# Patient Record
Sex: MALE | Employment: FULL TIME | URBAN - METROPOLITAN AREA
[De-identification: names, ages, dates, MRNs, and addresses within clinical notes are randomized per-mention and may not be internally consistent; named-entity substitution may affect disease eponyms.]

---

## 2023-06-29 ENCOUNTER — LAB ENCOUNTER (OUTPATIENT)
Dept: LAB | Age: 63
End: 2023-06-29
Attending: INTERNAL MEDICINE
Payer: COMMERCIAL

## 2023-06-29 ENCOUNTER — OFFICE VISIT (OUTPATIENT)
Dept: INTERNAL MEDICINE CLINIC | Facility: CLINIC | Age: 63
End: 2023-06-29

## 2023-06-29 VITALS
WEIGHT: 167 LBS | SYSTOLIC BLOOD PRESSURE: 130 MMHG | HEART RATE: 70 BPM | HEIGHT: 67 IN | OXYGEN SATURATION: 98 % | DIASTOLIC BLOOD PRESSURE: 84 MMHG | BODY MASS INDEX: 26.21 KG/M2

## 2023-06-29 DIAGNOSIS — Z13.29 THYROID DISORDER SCREEN: ICD-10-CM

## 2023-06-29 DIAGNOSIS — Z23 NEED FOR SHINGLES VACCINE: ICD-10-CM

## 2023-06-29 DIAGNOSIS — E55.9 VITAMIN D DEFICIENCY: ICD-10-CM

## 2023-06-29 DIAGNOSIS — Z13.0 SCREENING FOR DEFICIENCY ANEMIA: ICD-10-CM

## 2023-06-29 DIAGNOSIS — Z13.21 ENCOUNTER FOR VITAMIN DEFICIENCY SCREENING: ICD-10-CM

## 2023-06-29 DIAGNOSIS — Z00.00 WELLNESS EXAMINATION: Primary | ICD-10-CM

## 2023-06-29 DIAGNOSIS — Z13.220 LIPID SCREENING: ICD-10-CM

## 2023-06-29 DIAGNOSIS — C61 PROSTATE CANCER (HCC): ICD-10-CM

## 2023-06-29 DIAGNOSIS — Z00.00 WELLNESS EXAMINATION: ICD-10-CM

## 2023-06-29 DIAGNOSIS — R73.09 ELEVATED GLUCOSE: ICD-10-CM

## 2023-06-29 DIAGNOSIS — Z13.6 SCREENING FOR CARDIOVASCULAR CONDITION: ICD-10-CM

## 2023-06-29 DIAGNOSIS — Z12.5 PROSTATE CANCER SCREENING: ICD-10-CM

## 2023-06-29 DIAGNOSIS — R03.0 ELEVATED BLOOD PRESSURE READING: ICD-10-CM

## 2023-06-29 PROBLEM — M17.0 PRIMARY OSTEOARTHRITIS OF BOTH KNEES: Status: ACTIVE | Noted: 2023-06-29

## 2023-06-29 PROBLEM — R73.03 PREDIABETES: Status: ACTIVE | Noted: 2023-06-29

## 2023-06-29 PROBLEM — I83.90 VARICOSE VEINS: Status: ACTIVE | Noted: 2023-06-29

## 2023-06-29 PROBLEM — E78.2 MIXED HYPERLIPIDEMIA: Status: ACTIVE | Noted: 2023-06-29

## 2023-06-29 LAB
ALBUMIN SERPL-MCNC: 4.2 G/DL (ref 3.4–5)
ALBUMIN/GLOB SERPL: 1.3 {RATIO} (ref 1–2)
ALP LIVER SERPL-CCNC: 66 U/L
ALT SERPL-CCNC: 29 U/L
ANION GAP SERPL CALC-SCNC: 6 MMOL/L (ref 0–18)
AST SERPL-CCNC: 18 U/L (ref 15–37)
ATRIAL RATE: 55 BPM
BASOPHILS # BLD AUTO: 0.06 X10(3) UL (ref 0–0.2)
BASOPHILS NFR BLD AUTO: 0.8 %
BILIRUB SERPL-MCNC: 1 MG/DL (ref 0.1–2)
BUN BLD-MCNC: 16 MG/DL (ref 7–18)
BUN/CREAT SERPL: 16.7 (ref 10–20)
CALCIUM BLD-MCNC: 9.8 MG/DL (ref 8.5–10.1)
CHLORIDE SERPL-SCNC: 109 MMOL/L (ref 98–112)
CHOLEST SERPL-MCNC: 184 MG/DL (ref ?–200)
CO2 SERPL-SCNC: 27 MMOL/L (ref 21–32)
COMPLEXED PSA SERPL-MCNC: 0.02 NG/ML (ref ?–4)
CREAT BLD-MCNC: 0.96 MG/DL
DEPRECATED RDW RBC AUTO: 45 FL (ref 35.1–46.3)
EOSINOPHIL # BLD AUTO: 0.44 X10(3) UL (ref 0–0.7)
EOSINOPHIL NFR BLD AUTO: 5.8 %
ERYTHROCYTE [DISTWIDTH] IN BLOOD BY AUTOMATED COUNT: 14.2 % (ref 11–15)
EST. AVERAGE GLUCOSE BLD GHB EST-MCNC: 126 MG/DL (ref 68–126)
FASTING PATIENT LIPID ANSWER: YES
FASTING STATUS PATIENT QL REPORTED: YES
GFR SERPLBLD BASED ON 1.73 SQ M-ARVRAT: 89 ML/MIN/1.73M2 (ref 60–?)
GLOBULIN PLAS-MCNC: 3.3 G/DL (ref 2.8–4.4)
GLUCOSE BLD-MCNC: 104 MG/DL (ref 70–99)
HBA1C MFR BLD: 6 % (ref ?–5.7)
HCT VFR BLD AUTO: 48.6 %
HDLC SERPL-MCNC: 49 MG/DL (ref 40–59)
HGB BLD-MCNC: 15.6 G/DL
IMM GRANULOCYTES # BLD AUTO: 0.01 X10(3) UL (ref 0–1)
IMM GRANULOCYTES NFR BLD: 0.1 %
LDLC SERPL CALC-MCNC: 119 MG/DL (ref ?–100)
LYMPHOCYTES # BLD AUTO: 2.64 X10(3) UL (ref 1–4)
LYMPHOCYTES NFR BLD AUTO: 34.6 %
MCH RBC QN AUTO: 27.7 PG (ref 26–34)
MCHC RBC AUTO-ENTMCNC: 32.1 G/DL (ref 31–37)
MCV RBC AUTO: 86.3 FL
MONOCYTES # BLD AUTO: 0.65 X10(3) UL (ref 0.1–1)
MONOCYTES NFR BLD AUTO: 8.5 %
NEUTROPHILS # BLD AUTO: 3.82 X10 (3) UL (ref 1.5–7.7)
NEUTROPHILS # BLD AUTO: 3.82 X10(3) UL (ref 1.5–7.7)
NEUTROPHILS NFR BLD AUTO: 50.2 %
NONHDLC SERPL-MCNC: 135 MG/DL (ref ?–130)
OSMOLALITY SERPL CALC.SUM OF ELEC: 295 MOSM/KG (ref 275–295)
P AXIS: 62 DEGREES
P-R INTERVAL: 160 MS
PLATELET # BLD AUTO: 237 10(3)UL (ref 150–450)
POTASSIUM SERPL-SCNC: 4.9 MMOL/L (ref 3.5–5.1)
PROT SERPL-MCNC: 7.5 G/DL (ref 6.4–8.2)
Q-T INTERVAL: 400 MS
QRS DURATION: 86 MS
QTC CALCULATION (BEZET): 382 MS
R AXIS: 73 DEGREES
RBC # BLD AUTO: 5.63 X10(6)UL
SODIUM SERPL-SCNC: 142 MMOL/L (ref 136–145)
T AXIS: 58 DEGREES
TRIGL SERPL-MCNC: 89 MG/DL (ref 30–149)
TSI SER-ACNC: 1.64 MIU/ML (ref 0.36–3.74)
VENTRICULAR RATE: 55 BPM
VIT D+METAB SERPL-MCNC: 31.3 NG/ML (ref 30–100)
VLDLC SERPL CALC-MCNC: 16 MG/DL (ref 0–30)
WBC # BLD AUTO: 7.6 X10(3) UL (ref 4–11)

## 2023-06-29 PROCEDURE — 83036 HEMOGLOBIN GLYCOSYLATED A1C: CPT

## 2023-06-29 PROCEDURE — 36415 COLL VENOUS BLD VENIPUNCTURE: CPT

## 2023-06-29 PROCEDURE — 90471 IMMUNIZATION ADMIN: CPT | Performed by: INTERNAL MEDICINE

## 2023-06-29 PROCEDURE — 84443 ASSAY THYROID STIM HORMONE: CPT

## 2023-06-29 PROCEDURE — 90750 HZV VACC RECOMBINANT IM: CPT | Performed by: INTERNAL MEDICINE

## 2023-06-29 PROCEDURE — 3079F DIAST BP 80-89 MM HG: CPT | Performed by: INTERNAL MEDICINE

## 2023-06-29 PROCEDURE — 80061 LIPID PANEL: CPT

## 2023-06-29 PROCEDURE — 93000 ELECTROCARDIOGRAM COMPLETE: CPT | Performed by: INTERNAL MEDICINE

## 2023-06-29 PROCEDURE — 3075F SYST BP GE 130 - 139MM HG: CPT | Performed by: INTERNAL MEDICINE

## 2023-06-29 PROCEDURE — 3008F BODY MASS INDEX DOCD: CPT | Performed by: INTERNAL MEDICINE

## 2023-06-29 PROCEDURE — 99386 PREV VISIT NEW AGE 40-64: CPT | Performed by: INTERNAL MEDICINE

## 2023-06-29 PROCEDURE — 85025 COMPLETE CBC W/AUTO DIFF WBC: CPT

## 2023-06-29 PROCEDURE — 80053 COMPREHEN METABOLIC PANEL: CPT

## 2023-06-29 PROCEDURE — 82306 VITAMIN D 25 HYDROXY: CPT

## 2023-06-29 RX ORDER — NAPROXEN
POWDER (GRAM) MISCELLANEOUS AS DIRECTED
COMMUNITY

## 2023-06-29 RX ORDER — HYDROCODONE BITARTRATE AND ACETAMINOPHEN 10; 325 MG/1; MG/1
TABLET ORAL
COMMUNITY

## 2023-06-30 ENCOUNTER — TELEPHONE (OUTPATIENT)
Dept: INTERNAL MEDICINE CLINIC | Facility: CLINIC | Age: 63
End: 2023-06-30

## 2023-06-30 ENCOUNTER — PATIENT OUTREACH (OUTPATIENT)
Dept: CASE MANAGEMENT | Age: 63
End: 2023-06-30

## 2023-12-06 ENCOUNTER — TELEPHONE (OUTPATIENT)
Dept: INTERNAL MEDICINE CLINIC | Facility: CLINIC | Age: 63
End: 2023-12-06

## 2023-12-06 NOTE — TELEPHONE ENCOUNTER
Patient is due for 2nd shingrix vaccine, please assist with scheduling a nurse visit for Shingrix vaccine.

## 2023-12-06 NOTE — TELEPHONE ENCOUNTER
Daughter, would like to schedule the 2nd shingles vaccine for the patient. Please, confirm when the patient is due for it.

## 2023-12-13 ENCOUNTER — NURSE ONLY (OUTPATIENT)
Dept: INTERNAL MEDICINE CLINIC | Facility: CLINIC | Age: 63
End: 2023-12-13

## 2023-12-13 VITALS — TEMPERATURE: 99 F

## 2023-12-13 DIAGNOSIS — Z23 NEED FOR SHINGLES VACCINE: Primary | ICD-10-CM

## 2023-12-13 PROCEDURE — 90471 IMMUNIZATION ADMIN: CPT | Performed by: INTERNAL MEDICINE

## 2023-12-13 PROCEDURE — 90750 HZV VACC RECOMBINANT IM: CPT | Performed by: INTERNAL MEDICINE

## 2024-02-09 ENCOUNTER — LAB ENCOUNTER (OUTPATIENT)
Dept: LAB | Age: 64
End: 2024-02-09
Attending: INTERNAL MEDICINE
Payer: MEDICAID

## 2024-02-09 ENCOUNTER — MED REC SCAN ONLY (OUTPATIENT)
Dept: INTERNAL MEDICINE CLINIC | Facility: CLINIC | Age: 64
End: 2024-02-09

## 2024-02-09 ENCOUNTER — OFFICE VISIT (OUTPATIENT)
Dept: INTERNAL MEDICINE CLINIC | Facility: CLINIC | Age: 64
End: 2024-02-09
Payer: MEDICAID

## 2024-02-09 VITALS
DIASTOLIC BLOOD PRESSURE: 82 MMHG | OXYGEN SATURATION: 95 % | HEIGHT: 67 IN | SYSTOLIC BLOOD PRESSURE: 143 MMHG | BODY MASS INDEX: 26.37 KG/M2 | WEIGHT: 168 LBS | HEART RATE: 62 BPM

## 2024-02-09 DIAGNOSIS — R73.09 ELEVATED GLUCOSE: ICD-10-CM

## 2024-02-09 DIAGNOSIS — Z00.00 WELLNESS EXAMINATION: ICD-10-CM

## 2024-02-09 DIAGNOSIS — Z00.00 WELLNESS EXAMINATION: Primary | ICD-10-CM

## 2024-02-09 DIAGNOSIS — Z23 NEED FOR VACCINATION FOR PNEUMOCOCCUS: ICD-10-CM

## 2024-02-09 DIAGNOSIS — Z13.220 LIPID SCREENING: ICD-10-CM

## 2024-02-09 DIAGNOSIS — Z23 NEEDS FLU SHOT: ICD-10-CM

## 2024-02-09 DIAGNOSIS — R03.0 ELEVATED BLOOD PRESSURE READING: ICD-10-CM

## 2024-02-09 DIAGNOSIS — M17.0 PRIMARY OSTEOARTHRITIS OF BOTH KNEES: ICD-10-CM

## 2024-02-09 LAB
CHOLEST SERPL-MCNC: 162 MG/DL (ref ?–200)
EST. AVERAGE GLUCOSE BLD GHB EST-MCNC: 131 MG/DL (ref 68–126)
FASTING PATIENT LIPID ANSWER: YES
HBA1C MFR BLD: 6.2 % (ref ?–5.7)
HDLC SERPL-MCNC: 43 MG/DL (ref 40–59)
LDLC SERPL CALC-MCNC: 99 MG/DL (ref ?–100)
NONHDLC SERPL-MCNC: 119 MG/DL (ref ?–130)
TRIGL SERPL-MCNC: 107 MG/DL (ref 30–149)
VLDLC SERPL CALC-MCNC: 18 MG/DL (ref 0–30)

## 2024-02-09 PROCEDURE — 90471 IMMUNIZATION ADMIN: CPT | Performed by: INTERNAL MEDICINE

## 2024-02-09 PROCEDURE — 80061 LIPID PANEL: CPT

## 2024-02-09 PROCEDURE — 83036 HEMOGLOBIN GLYCOSYLATED A1C: CPT

## 2024-02-09 PROCEDURE — 90686 IIV4 VACC NO PRSV 0.5 ML IM: CPT | Performed by: INTERNAL MEDICINE

## 2024-02-09 PROCEDURE — 99396 PREV VISIT EST AGE 40-64: CPT | Performed by: INTERNAL MEDICINE

## 2024-02-09 PROCEDURE — 36415 COLL VENOUS BLD VENIPUNCTURE: CPT

## 2024-02-09 PROCEDURE — 90677 PCV20 VACCINE IM: CPT | Performed by: INTERNAL MEDICINE

## 2024-02-09 PROCEDURE — 90472 IMMUNIZATION ADMIN EACH ADD: CPT | Performed by: INTERNAL MEDICINE

## 2024-02-09 NOTE — PATIENT INSTRUCTIONS
Cómo controlar la presión arterial erika  La presión arterial erika (hipertensión) se conoce también shantel el asesino silencioso. Se la llama así porque muchas personas la tienen sin saberlo. Puede ser muy peligrosa. La presión arterial erika puede aumentar el riesgo de ataques al corazón, derrames cerebrales, enfermedades del corazón o insuficiencia cardíaca. Controlar la presión arterial puede disminuir el riesgo de tener estos problemas. Es importantecontrolar la presión arterial de manera periódica. [place] puede salvarle la iván.   Las mediciones de la presión arterial se indican con 2 números. La presión arterial sistólica es el número superior. Es la presión cuando el corazón se contrae. La presión arterial diastólica es el número inferior. Es la presión cuando el corazón se relaja entrelatidos.   La presión arterial se agrupa de la siguiente manera:  Presión arterial normal. Se llama de esta manera cuando la presión sistólica es brett que 120 y la diastólica es brett que 80 (120/80).  Presión arterial elevada. Se llama de esta manera cuando la presión sistólica es de 120 a 129 y la diastólica es brett que 80.  Presión arterial erika de etapa 1. La presión sistólica está entre 130 y 139 o la diastólica está entre 80 y 89.  Presión arterial erika de etapa 2. La presión sistólica es 140 o superior o la diastólica es 90 o superior.  Un estilo de iván saludable para el corazón puede ayudarlo a controlar garcia presión arterial sin tener que hernandez medicamentos. Más abajo encontrará algunasrecomendaciones a fin de llevar un estilo de iván saludable para el corazón.     Coma alimentos saludables para el corazón  Elija alimentos con bajo contenido de sodio y grasas. Limite la ingesta de sodio a 2,300 mg diarios o según la cantidad que le indique el proveedor de atención médica.  Limite el consumo de comidas enlatadas, secas, curadas, envasadas y las comidas rápidas. Pueden contener mucha sal.  Consuma entre ocho y gabriele porciones  de frutas y verduras todos los leora.  Elija flor de res magras, pescado o domenica.  Coma pasta y arroz integrales y frijoles.  Consuma entre dos y constance porciones de productos lácteos descremados o bajos en grasa.  Consulte con garcia médico acerca del plan de alimentación DASH. Shayna plan ayuda a reducir la presión arterial.  Cuando coma en un restaurante, pida que le preparen los platos sin sal.    Mantenga un peso saludable  Pregunte a garcia proveedor de atención médica cuántas calorías puede consumir por día. Respete michelle cantidad.  Pregunte al proveedor cuáles son los límites de peso más adecuados para usted. Si tiene sobrepeso, perder entre un 3 % y un 5 % de garcia peso corporal puede ayudarlo a disminuir garcia presión arterial. Un buen objetivo es perder un 10 % de garcia peso corporal en un año.  Limite el consumo de tentempiés y dulces.  Nancy ejercicio con regularidad.    Nancy más actividad física  Busque actividades que le gusten. Puede hacerlas de forma individual, o con amigos o garcia alicia. Pruebe andar en bicicleta, bailar, caminar o trotar.  Estacione lejos de las entradas de los edificios para caminar más.  Use las escaleras en lugar del ascensor.  Cuando pueda, camine o vaya en bicicleta en vez de ir en automóvil.  [place] hojas, trabaje en garcia jardín o nancy reparaciones en la casa.  Nancy actividad física de moderada a intensa aniya, al menos, 30 minutos por día, un mínimo de sakina días a la semana.     Controle el estrés  Tómese tiempo para relajarse y disfrutar de la iván. Encuentre tiempo para reírse.  Comparta rajinder preocupaciones con rajinder seres queridos y con el proveedor de atención médica.  Visite a rajinder familiares y amigos, y mantenga rajinder pasatiempos favoritos.    Limite el alcohol y deje de fumar  Los hombres no deberían hernandez más de dos bebidas alcohólicas por día.  Las mujeres no deberían hernandez más de jennifer bebida alcohólica por día.  Si fuma, elabore un plan para dejar de hacerlo. Pídale ayuda a garcia proveedor de  atención médica. Si fuma mucho, aumenta el riesgo de padecer enfermedades del corazón y derrames cerebrales. Consulte con el proveedor sobre programas para dejar de fumar y otros recursos de apoyo.    Medicamentos para la presión arterial  Si los cambios en el estilo de iván no son suficientes, el proveedor de atención médica puede recetarle medicamentos para la presión arterial chandrika. [place] todos los medicamentos según le indiquen. Si tiene alguna pregunta sobre los medicamentos, consulte al proveedor antes de dejar de tomarlos ocambiarlos.     © 2000-2022 [employer]. Todos los derechos reservados. Esta información no pretende sustituir la atención médica profesional. Sólo sumédico puede diagnosticar y tratar un problema de timothy.    Video [employer]  ¿Qué es Presión Arterial Chandrika?  Entender que es Presión Arterial, los riesgos en la timothy al tener Presión Arterial Chandrika, los factores que lo ponen a usted en riesgo de tener Presión Arterial [place] y la importancia de trabajar con garcia proveedor del cuidado de la timothy para controlarla.  Para rob el video:  Escanee el código code  Utilizando garcia dispositivo móvil, escanee el siguiente código:  OR  Giovanni al sitio web:  [employer]  Ingrese el código de receta:  3414S    © 2000-2022 [employer]. All rights reserved. This information is not intended as a substitute for professional medical care. Always follow your healthcare professional's instructions.    Video [employer]  [place] amrik con presión arterial elevada  Ciertos alimentos pueden hacer que garcia presión suba demasiado. Ric y aprenda lo fácil que es disfrutar de comidas deliciosas sin dañar garcia timothy.     Para rob el video:  Escanee el código QR  Utilizando garcia dispositivo móvil, escanee el siguiente código:  MANE Davila al [employer] web:  www."[employer]  Ingrese el código de receta:   DPX    © 6697-1745 [employer]. All rights reserved. This information is not  intended as a substitute for professional medical care. Always follow your healthcare professional's instructions.    Cómo tomarse la presión arterial  La presión arterial es la fuerza que la brian ejerce contra las pulido de los vasos al desplazarse por ellos. Usted puede tomarse garcia propia presión arterial con un medidor digital. Tómese la presión arterial a la misma hora y en elmismo brazo, tan a menudo shantel le indique garcia proveedor de atención médica.   Acerca de los monitores de presión arterial   Los monitores de presión arterial están diseñados para diferentes edades y diferentes casos. Usted puede encontrar monitores para adultos mayores, para mujeres embarazadas y para niños. Asegúrese de que el que escoja es el adecuadopara garcia edad y situación.   La American [employer] recomienda un monitor automatizado con manguito que se adapte al tamaño de garcia brazo (biceps). Garcia brazo debe caber amrik dentro del manguito, ya que si es muy kendy o muy pequeño no suministrará jennifer lectura exacta. Mídase el grosor de garcia barazo paraencontrar la talla correcta para usted.   Los monitores que se ahieren a un dedo o a la binh no son tan precisos comolos que se adhieren a garcia brazo.   Pídale a garcia proveedor de atención médica que le ayude a escoger un monitor. Traiga el monitor a garcia próxima lexus médica si necesita ayuda para aprender ausarlo correctamente.   Los pasos que se dan a continuación son instrucciones generales para usar unmonitor digital automatizado.     1. Relájese  Tómese la presión arterial a la misma hora todos los días, shantel temprano en la mañana o al final de la tarde.  Espere al menos jennifer hora después de kevin fumado, comido o hecho ejercicio. No ryan café, té, soda u otras bebidas cafeínadas antes de tomarse la presión.  Siéntese cómodamente a la cho. Coloque el medidor digital cerca de usted.  Descanse aniya algunos minutos antes de empezar.    2. Coloque el manguito  Ponga el brazo sobre la cho,  con la rodriguez de la mano hacia arriba. El brazo debe quedar a la altura del corazón. Ajuste el manguito alrededor del brazo, joleen por encima del codo. Es mejor colocarlo sobre la piel desnuda, sin ropa. La mayoría de los manguitos le indicarán donde debe alinearlo con la arteria braquial (el vaso sanguíneo en la mitad del brazo en la parte interna del codo). Yanely las instrucciones que vienen con garcia monitor para rob jennifer ilustración. También puede llevar garcia manguito a la lexus con garcia proveedor de atención médica para que le muestren cómo usarlo correctamente.    3. Infle el manguito  Oprima el botón para iniciar el bombeo automático.  El manguito evonne se aprieta y luego se afloja.  Los números cambian. Cuando paran de cambiar, el medidor indicará garcia presión arterial.  Tómese 2 o 3 lecturas con un minuto de diferencia entre jennifer y otra.      4. Anote los resultados  [place] nota de los números de garcia presión arterial, así shantel de la fecha y la hora de la medición. Mantenga los resultados en un solo lugar, por ejemplo en un cuaderno. Incluso si garcia monitor tiene jennifer memoria incorporada, mantenga jennifer copia por escrito de los resultados.  Quite el manguito del brazo y apague la máquina.  Traiga los resultados de rajinder lecturas a cada jennifer de las citas con garcia proveedor de atención médica. También anote el días si cambió la dosis de garcia medicamento. Monty inforamación debe incuirse en los registros que nancy de garcia presión arterial para que le ayude a garcia proveedor de atención médica a evaluar qué tan amrik están funcionando los cambios en el medicamento.  Pregúntele a garcia proveedor de atención médica a qué niveles debe obtener ayuda de inmediato.      © 4385-8325 [employer]. Todos los derechos reservados. Esta información no pretende sustituir la atención médica profesional. Sólo sumédico puede diagnosticar y tratar un problema de timothy.

## 2024-02-09 NOTE — PROGRESS NOTES
Outpatient Office Note    HPI:     Salomón Wilde is a 63 year old male.  Chief Complaint   Patient presents with    Physical       Very pleasant 63-year-old gentleman with past medical history of prostate cancer status post resection in  in remission, prediabetes, hyperlipidemia, vitamin D deficiency, primary osteoarthritis of both knees status post total right knee replacement and partial left knee replacement who is here for annual physical     Patient is active but has been suffering from bilateral knee pain recently.    Last tetanus shot was 5 years ago as far as he knows blood pressure was found elevated today.  Patient does not carry history of hypertension.  Currently asymptomatic      Current Outpatient Medications   Medication Sig Dispense Refill    Multiple Vitamins-Minerals (MULTIVITAMIN ADULT OR) Take by mouth.      aspirin 81 MG Oral Tab Take 1 tablet (81 mg total) by mouth daily.      Naproxen Does not apply Powder Take by mouth As Directed. (Patient not taking: Reported on 2023)      HYDROcodone-acetaminophen  MG Oral Tab TAKE 1 TABLET BY MOUTH EVERY 6 HOURS AS NEEDED FOR PAIN AFTER SURGERY (Patient not taking: Reported on 2024)      simvastatin 20 MG Oral Tab Take 1 tablet (20 mg total) by mouth nightly. (Patient not taking: Reported on 2024)        Past Medical History:   Diagnosis Date    Asthma     Prostate cancer (HCC)       Past Surgical History:   Procedure Laterality Date    CHOLECYSTECTOMY      HERNIA SURGERY      OTHER      prostate removal    TOTAL KNEE REPLACEMENT Right       Social History:  Social History     Socioeconomic History    Marital status:    Tobacco Use    Smoking status: Former     Packs/day: 0.25     Years: 15.00     Additional pack years: 0.00     Total pack years: 3.75     Types: Cigarettes     Quit date:      Years since quittin.1   Substance and Sexual Activity    Alcohol use: Not Currently     Comment: socially    Drug use: No       Family History   Problem Relation Age of Onset    Diabetes Mother       No Known Allergies     REVIEW OF SYSTEMS:   Review of Systems   Review of Systems   Constitutional: Negative for activity change, appetite change and fever.   HENT: Negative for congestion and voice change.    Respiratory: Negative for cough and shortness of breath.    Cardiovascular: Negative for chest pain.   Gastrointestinal: Negative for abdominal distention, abdominal pain and vomiting.   Genitourinary: Negative for hematuria.   Skin: Negative for wound.   Psychiatric/Behavioral: Negative for behavioral problems.   Wt Readings from Last 5 Encounters:   02/09/24 168 lb (76.2 kg)   06/29/23 167 lb (75.8 kg)   09/22/16 178 lb (80.7 kg)     Body mass index is 26.31 kg/m².      EXAM:   /82   Pulse 62   Ht 5' 7\" (1.702 m)   Wt 168 lb (76.2 kg)   SpO2 95%   BMI 26.31 kg/m²   Physical Exam   Constitutional:       Appearance: Normal appearance.   HENT:      Head: Normocephalic.   Eyes:      Conjunctiva/sclera: Conjunctivae normal.   Cardiovascular:      Rate and Rhythm: Normal rate and regular rhythm.      Heart sounds: Normal heart sounds. No murmur heard.  Pulmonary:      Effort: Pulmonary effort is normal.      Breath sounds: Normal breath sounds. No rhonchi or rales.   Abdominal:      General: Bowel sounds are normal.      Palpations: Abdomen is soft.      Tenderness: There is no abdominal tenderness.   Musculoskeletal:      Cervical back: Neck supple.      Right lower leg: No edema.      Left lower leg: No edema.   Skin:     General: Skin is warm and dry.   Neurological:      General: No focal deficit present.      Mental Status: He is alert and oriented to person, place, and time. Mental status is at baseline.   Psychiatric:         Mood and Affect: Mood normal.         Behavior: Behavior normal.       Health Maintenence:     Health Maintenance Topics with due status: Overdue       Topic Date Due    Pneumococcal Vaccine: Birth to  63yrs 03/13/2017    COVID-19 Vaccine 09/01/2023    Influenza Vaccine 10/01/2023    Annual Depression Screening 01/01/2024            ASSESSMENT AND PLAN:   1. Primary osteoarthritis of both knees  - Physical Therapy Referral - [facility]    2. Need for vaccination for pneumococcus  - Prevnar 20 (PCV20) [[id number]]    3. Elevated glucose  - Hemoglobin A1C; Future    4. Wellness examination  - Lipid Panel; Future  - Hemoglobin A1C; Future    5. Lipid screening  - Lipid Panel; Future    6. Needs flu shot  - Fluzone Quadrivalent 6mo+ 0.5mL    7. Elevated blood pressure reading  -Instructed the patient to get a blood pressure machine, check blood pressure levels daily in the morning and keep a log of the readings for review.        The patient indicates understanding of these issues and agrees to the plan.  No follow-ups on file.        This note was prepared using Dragon Medical voice recognition dictation software. As a result errors may occur. When identified these errors have been corrected. While every attempt is made to correct errors during dictation discrepancies may still exist.    Brittney Patrick MD

## 2024-02-12 DIAGNOSIS — R73.03 PREDIABETES: Primary | ICD-10-CM

## 2024-02-12 RX ORDER — METFORMIN HYDROCHLORIDE 500 MG/1
500 TABLET, EXTENDED RELEASE ORAL
Qty: 90 TABLET | Refills: 3 | Status: SHIPPED | OUTPATIENT
Start: 2024-02-12

## 2024-02-27 ENCOUNTER — OFFICE VISIT (OUTPATIENT)
Dept: INTERNAL MEDICINE CLINIC | Facility: CLINIC | Age: 64
End: 2024-02-27

## 2024-02-27 VITALS
BODY MASS INDEX: 26.68 KG/M2 | HEART RATE: 70 BPM | HEIGHT: 67 IN | OXYGEN SATURATION: 98 % | SYSTOLIC BLOOD PRESSURE: 142 MMHG | DIASTOLIC BLOOD PRESSURE: 78 MMHG | WEIGHT: 170 LBS

## 2024-02-27 DIAGNOSIS — R03.0 ELEVATED BLOOD PRESSURE READING: Primary | ICD-10-CM

## 2024-02-27 PROCEDURE — 99213 OFFICE O/P EST LOW 20 MIN: CPT | Performed by: INTERNAL MEDICINE

## 2024-02-27 NOTE — PROGRESS NOTES
Outpatient Office Note    HPI:     Salomón Wilde is a 63 year old male.  Chief Complaint   Patient presents with    Follow - Up     BP follow-up         Very pleasant 63-year-old gentleman with past medical history of prostate cancer status post resection in 2007 in remission, prediabetes, hyperlipidemia, vitamin D deficiency, primary osteoarthritis of both knees status post total right knee replacement and partial left knee replacement who is here for follow-up on blood pressure      Patient has been checking his blood pressure daily at home with a wrist machine.  Readings are all in the 120s/70s.  However his blood pressure remains elevated today in the office.  Patient remains asymptomatic.  Reports his diet has become healthier as he and his wife have been trying to eliminate grease and unhealthy fats.      Current Outpatient Medications   Medication Sig Dispense Refill    metFORMIN  MG Oral Tablet 24 Hr Take 1 tablet (500 mg total) by mouth daily with breakfast. 90 tablet 3    Multiple Vitamins-Minerals (MULTIVITAMIN ADULT OR) Take by mouth.      aspirin 81 MG Oral Tab Take 1 tablet (81 mg total) by mouth daily.      Naproxen Does not apply Powder Take by mouth As Directed. (Patient not taking: Reported on 6/29/2023)      HYDROcodone-acetaminophen  MG Oral Tab TAKE 1 TABLET BY MOUTH EVERY 6 HOURS AS NEEDED FOR PAIN AFTER SURGERY (Patient not taking: Reported on 2/9/2024)      simvastatin 20 MG Oral Tab Take 1 tablet (20 mg total) by mouth nightly. (Patient not taking: Reported on 2/9/2024)        Past Medical History:   Diagnosis Date    Asthma (HCC)     Prostate cancer (HCC)       Past Surgical History:   Procedure Laterality Date    CHOLECYSTECTOMY      HERNIA SURGERY      OTHER      prostate removal    TOTAL KNEE REPLACEMENT Right       Social History:  Social History     Socioeconomic History    Marital status:    Tobacco Use    Smoking status: Former     Packs/day: 0.25     Years: 15.00      Additional pack years: 0.00     Total pack years: 3.75     Types: Cigarettes     Quit date:      Years since quittin.1   Substance and Sexual Activity    Alcohol use: Not Currently     Comment: socially    Drug use: No      Family History   Problem Relation Age of Onset    Diabetes Mother       No Known Allergies     REVIEW OF SYSTEMS:   Review of Systems   Review of Systems   Constitutional: Negative for activity change, appetite change and fever.   HENT: Negative for congestion and voice change.    Respiratory: Negative for cough and shortness of breath.    Cardiovascular: Negative for chest pain.   Gastrointestinal: Negative for abdominal distention, abdominal pain and vomiting.   Genitourinary: Negative for hematuria.   Skin: Negative for wound.   Psychiatric/Behavioral: Negative for behavioral problems.   Wt Readings from Last 5 Encounters:   24 170 lb (77.1 kg)   24 168 lb (76.2 kg)   23 167 lb (75.8 kg)   16 178 lb (80.7 kg)     Body mass index is 26.63 kg/m².      EXAM:   /78   Pulse 70   Ht 5' 7\" (1.702 m)   Wt 170 lb (77.1 kg)   SpO2 98%   BMI 26.63 kg/m²   Physical Exam   Constitutional:       Appearance: Normal appearance.   HENT:      Head: Normocephalic.   Eyes:      Conjunctiva/sclera: Conjunctivae normal.   Cardiovascular:      Rate and Rhythm: Normal rate   Pulmonary:      Effort: Pulmonary effort is normal.   Skin:     General: Skin is warm and dry.   Neurological:      General: No focal deficit present.      Mental Status: He is alert and oriented to person, place, and time. Mental status is at baseline.   Psychiatric:         Mood and Affect: Mood normal.         Behavior: Behavior normal.       Health Maintenence:     Health Maintenance Topics with due status: Overdue       Topic Date Due    COVID-19 Vaccine 2023     Health Maintenance Topics with due status: Due Soon       Topic Date Due    HTN: BP Follow-Up 2024               ASSESSMENT AND PLAN:   1. Elevated blood pressure reading  -Patient instructed to bring his blood pressure machine to the next appointment so that we can compare its readings to ours          The patient indicates understanding of these issues and agrees to the plan.  No follow-ups on file.        This note was prepared using Dragon Medical voice recognition dictation software. As a result errors may occur. When identified these errors have been corrected. While every attempt is made to correct errors during dictation discrepancies may still exist.    Brittney Patrick MD

## 2024-03-25 ENCOUNTER — OFFICE VISIT (OUTPATIENT)
Dept: INTERNAL MEDICINE CLINIC | Facility: CLINIC | Age: 64
End: 2024-03-25

## 2024-03-25 VITALS
BODY MASS INDEX: 26.68 KG/M2 | DIASTOLIC BLOOD PRESSURE: 80 MMHG | WEIGHT: 170 LBS | OXYGEN SATURATION: 97 % | SYSTOLIC BLOOD PRESSURE: 125 MMHG | HEART RATE: 62 BPM | HEIGHT: 67 IN

## 2024-03-25 DIAGNOSIS — R09.82 POST-NASAL DRIP: Primary | ICD-10-CM

## 2024-03-25 DIAGNOSIS — R03.0 ELEVATED BLOOD PRESSURE READING: ICD-10-CM

## 2024-03-25 PROCEDURE — 99213 OFFICE O/P EST LOW 20 MIN: CPT | Performed by: INTERNAL MEDICINE

## 2024-03-25 RX ORDER — FLUTICASONE PROPIONATE 50 MCG
2 SPRAY, SUSPENSION (ML) NASAL DAILY
Qty: 15.8 ML | Refills: 0 | Status: SHIPPED | OUTPATIENT
Start: 2024-03-25 | End: 2025-03-20

## 2024-03-25 RX ORDER — ASPIRIN 81 MG/1
81 TABLET, CHEWABLE ORAL DAILY
Qty: 90 TABLET | Refills: 3 | Status: SHIPPED | OUTPATIENT
Start: 2024-03-25

## 2024-03-25 NOTE — PROGRESS NOTES
Outpatient Office Note    HPI:     Salomón Wilde is a 63 year old male.  Chief Complaint   Patient presents with    Follow - Up     BP follow-up       Very pleasant 63-year-old gentleman with past medical history of prostate cancer status post resection in 2007 in remission, prediabetes, hyperlipidemia, vitamin D deficiency, primary osteoarthritis of both knees status post total right knee replacement and partial left knee replacement who is here for follow-up on blood pressure     Patient brought his blood pressure machine to today's appointment it shows discrepancy when compared to ours.  He uses a blood pressure machine with a wrist cuff.  He was instructed to get a machine with an arm cuff instead.    Has been having postnasal drip that has been causing throat irritation for the past week or so.  No fever or chills.      Current Outpatient Medications   Medication Sig Dispense Refill    aspirin 81 MG Oral Chew Tab Chew 1 tablet (81 mg total) by mouth daily. 90 tablet 3    fluticasone propionate 50 MCG/ACT Nasal Suspension 2 sprays by Each Nare route daily. 15.8 mL 0    metFORMIN  MG Oral Tablet 24 Hr Take 1 tablet (500 mg total) by mouth daily with breakfast. 90 tablet 3    Multiple Vitamins-Minerals (MULTIVITAMIN ADULT OR) Take by mouth.      aspirin 81 MG Oral Tab Take 1 tablet (81 mg total) by mouth daily.      Naproxen Does not apply Powder Take by mouth As Directed. (Patient not taking: Reported on 6/29/2023)      HYDROcodone-acetaminophen  MG Oral Tab TAKE 1 TABLET BY MOUTH EVERY 6 HOURS AS NEEDED FOR PAIN AFTER SURGERY (Patient not taking: Reported on 2/9/2024)      simvastatin 20 MG Oral Tab Take 1 tablet (20 mg total) by mouth nightly. (Patient not taking: Reported on 2/9/2024)        Past Medical History:   Diagnosis Date    Asthma (HCC)     Prostate cancer (HCC)       Past Surgical History:   Procedure Laterality Date    CHOLECYSTECTOMY      HERNIA SURGERY      OTHER      prostate removal     TOTAL KNEE REPLACEMENT Right       Social History:  Social History     Socioeconomic History    Marital status:    Tobacco Use    Smoking status: Former     Packs/day: 0.25     Years: 15.00     Additional pack years: 0.00     Total pack years: 3.75     Types: Cigarettes     Quit date:      Years since quittin.2   Substance and Sexual Activity    Alcohol use: Not Currently     Comment: socially    Drug use: No      Family History   Problem Relation Age of Onset    Diabetes Mother       No Known Allergies     REVIEW OF SYSTEMS:   Review of Systems   Review of Systems   Constitutional: Negative for activity change, appetite change and fever.   HENT: Negative for congestion and voice change.    Respiratory: Negative for cough and shortness of breath.    Cardiovascular: Negative for chest pain.   Gastrointestinal: Negative for abdominal distention, abdominal pain and vomiting.   Genitourinary: Negative for hematuria.   Skin: Negative for wound.   Psychiatric/Behavioral: Negative for behavioral problems.   Wt Readings from Last 5 Encounters:   24 170 lb (77.1 kg)   24 170 lb (77.1 kg)   24 168 lb (76.2 kg)   23 167 lb (75.8 kg)   16 178 lb (80.7 kg)     Body mass index is 26.63 kg/m².      EXAM:   /80   Pulse 62   Ht 5' 7\" (1.702 m)   Wt 170 lb (77.1 kg)   SpO2 97%   BMI 26.63 kg/m²   Physical Exam   Constitutional:       Appearance: Normal appearance.   HENT:      Head: Normocephalic.   Eyes:      Conjunctiva/sclera: Conjunctivae normal.   Cardiovascular:      Rate and Rhythm: Normal rate and regular rhythm.      Heart sounds: Normal heart sounds. No murmur heard.  Pulmonary:      Effort: Pulmonary effort is normal.      Breath sounds: Normal breath sounds. No rhonchi or rales.   Abdominal:      General: Bowel sounds are normal.      Palpations: Abdomen is soft.      Tenderness: There is no abdominal tenderness.   Musculoskeletal:      Cervical back: Neck  supple.      Right lower leg: No edema.      Left lower leg: No edema.   Skin:     General: Skin is warm and dry.   Neurological:      General: No focal deficit present.      Mental Status: He is alert and oriented to person, place, and time. Mental status is at baseline.   Psychiatric:         Mood and Affect: Mood normal.         Behavior: Behavior normal.       Health Maintenence:     Health Maintenance Topics with due status: Overdue       Topic Date Due    COVID-19 Vaccine 09/01/2023     Health Maintenance Topics with due status: Due Soon       Topic Date Due    HTN: BP Follow-Up 03/27/2024            ASSESSMENT AND PLAN:   1. Post-nasal drip  - fluticasone propionate 50 MCG/ACT Nasal Suspension; 2 sprays by Each Nare route daily.  Dispense: 15.8 mL; Refill: 0    2. Elevated blood pressure reading  -Blood pressure looks good today.  Patient will get a better machine and continue to monitor her blood pressure at home before we make a final decision whether or not he needs to be started on treatment          The patient indicates understanding of these issues and agrees to the plan.  No follow-ups on file.        This note was prepared using Dragon Medical voice recognition dictation software. As a result errors may occur. When identified these errors have been corrected. While every attempt is made to correct errors during dictation discrepancies may still exist.    Brtitney Patrick MD

## 2024-04-09 ENCOUNTER — OFFICE VISIT (OUTPATIENT)
Dept: INTERNAL MEDICINE CLINIC | Facility: CLINIC | Age: 64
End: 2024-04-09

## 2024-04-09 VITALS
HEART RATE: 68 BPM | WEIGHT: 171 LBS | HEIGHT: 67 IN | SYSTOLIC BLOOD PRESSURE: 163 MMHG | BODY MASS INDEX: 26.84 KG/M2 | OXYGEN SATURATION: 97 % | DIASTOLIC BLOOD PRESSURE: 95 MMHG

## 2024-04-09 DIAGNOSIS — J01.00 ACUTE NON-RECURRENT MAXILLARY SINUSITIS: Primary | ICD-10-CM

## 2024-04-09 DIAGNOSIS — R05.1 ACUTE COUGH: ICD-10-CM

## 2024-04-09 DIAGNOSIS — H92.02 LEFT EAR PAIN: ICD-10-CM

## 2024-04-09 PROCEDURE — 99214 OFFICE O/P EST MOD 30 MIN: CPT | Performed by: INTERNAL MEDICINE

## 2024-04-09 RX ORDER — DEXTROMETHORPHAN HBR. AND GUAIFENESIN 10; 100 MG/5ML; MG/5ML
5 SOLUTION ORAL EVERY 12 HOURS
Qty: 236 ML | Refills: 0 | Status: SHIPPED | OUTPATIENT
Start: 2024-04-09

## 2024-04-09 RX ORDER — AMOXICILLIN 500 MG/1
500 CAPSULE ORAL 2 TIMES DAILY
COMMUNITY
Start: 2024-04-05

## 2024-04-09 RX ORDER — AZITHROMYCIN 250 MG/1
TABLET, FILM COATED ORAL
Qty: 6 TABLET | Refills: 0 | Status: SHIPPED | OUTPATIENT
Start: 2024-04-09 | End: 2024-04-13

## 2024-04-09 RX ORDER — BENZONATATE 200 MG/1
200 CAPSULE ORAL 3 TIMES DAILY PRN
Qty: 30 CAPSULE | Refills: 0 | Status: SHIPPED | OUTPATIENT
Start: 2024-04-09

## 2024-04-09 NOTE — PROGRESS NOTES
Outpatient Office Note    HPI:     Salomón Wilde is a 63 year old male.  Chief Complaint   Patient presents with    Sore Throat    Headache         Sore throat and runny nose, sinus congestion  4 day(s) ago  Fever - No, no chills  Cough - Yes, productive - No, painful, more at night, waking him up from sleep  Left ear discomfort  Sick contacts - No  Travel - No  Other symptoms - no chest pain or SOB  Treatments done - Mucinex, flonase   Went to  on Friday, tested COVID and strep negative as far as he knows          Current Outpatient Medications   Medication Sig Dispense Refill    amoxicillin 500 MG Oral Cap Take 1 capsule (500 mg total) by mouth 2 (two) times daily.      azithromycin 250 MG Oral Tab Take 2 tablets (500 mg total) by mouth daily for 1 day, THEN 1 tablet (250 mg total) daily for 4 days. 6 tablet 0    benzonatate 200 MG Oral Cap Take 1 capsule (200 mg total) by mouth 3 (three) times daily as needed for cough. 30 capsule 0    dextromethorphan-guaiFENesin  MG/5ML Oral Liquid Take 5 mL by mouth every 12 (twelve) hours. 236 mL 0    aspirin 81 MG Oral Chew Tab Chew 1 tablet (81 mg total) by mouth daily. 90 tablet 3    fluticasone propionate 50 MCG/ACT Nasal Suspension 2 sprays by Each Nare route daily. 15.8 mL 0    metFORMIN  MG Oral Tablet 24 Hr Take 1 tablet (500 mg total) by mouth daily with breakfast. 90 tablet 3    Multiple Vitamins-Minerals (MULTIVITAMIN ADULT OR) Take by mouth.      aspirin 81 MG Oral Tab Take 1 tablet (81 mg total) by mouth daily.      Naproxen Does not apply Powder Take by mouth As Directed. (Patient not taking: Reported on 6/29/2023)      HYDROcodone-acetaminophen  MG Oral Tab TAKE 1 TABLET BY MOUTH EVERY 6 HOURS AS NEEDED FOR PAIN AFTER SURGERY (Patient not taking: Reported on 2/9/2024)      simvastatin 20 MG Oral Tab Take 1 tablet (20 mg total) by mouth nightly. (Patient not taking: Reported on 2/9/2024)        Past Medical History:   Diagnosis Date     Asthma (HCC)     Prostate cancer (HCC)       Past Surgical History:   Procedure Laterality Date    CHOLECYSTECTOMY      HERNIA SURGERY      OTHER      prostate removal    TOTAL KNEE REPLACEMENT Right       Social History:  Social History     Socioeconomic History    Marital status:    Tobacco Use    Smoking status: Former     Packs/day: 0.25     Years: 15.00     Additional pack years: 0.00     Total pack years: 3.75     Types: Cigarettes     Quit date:      Years since quittin.2   Substance and Sexual Activity    Alcohol use: Not Currently     Comment: socially    Drug use: No      Family History   Problem Relation Age of Onset    Diabetes Mother       No Known Allergies     REVIEW OF SYSTEMS:   Review of Systems   Review of Systems   Constitutional: Negative for activity change, appetite change and fever.   HENT: Negative for congestion and voice change.    Respiratory: Negative for cough and shortness of breath.    Cardiovascular: Negative for chest pain.   Gastrointestinal: Negative for abdominal distention, abdominal pain and vomiting.   Genitourinary: Negative for hematuria.   Skin: Negative for wound.   Psychiatric/Behavioral: Negative for behavioral problems.   Wt Readings from Last 5 Encounters:   24 171 lb (77.6 kg)   24 170 lb (77.1 kg)   24 170 lb (77.1 kg)   24 168 lb (76.2 kg)   23 167 lb (75.8 kg)     Body mass index is 26.78 kg/m².      EXAM:   BP (!) 163/95   Pulse 68   Ht 5' 7\" (1.702 m)   Wt 171 lb (77.6 kg)   SpO2 97%   BMI 26.78 kg/m²   Physical Exam  HENT:      Left Ear: Swelling present. A middle ear effusion is present.      Mouth/Throat:      Pharynx: Pharyngeal swelling and posterior oropharyngeal erythema present.        Constitutional:       Appearance: Normal appearance.   HENT:      Head: Normocephalic.   Eyes:      Conjunctiva/sclera: Conjunctivae normal.   Cardiovascular:      Rate and Rhythm: Normal rate   Pulmonary:      Effort:  Pulmonary effort is normal.      Breath sounds: Normal breath sounds. No rhonchi or rales.   Skin:     General: Skin is warm and dry.   Neurological:      General: No focal deficit present.      Mental Status: He is alert and oriented to person, place, and time. Mental status is at baseline.   Psychiatric:         Mood and Affect: Mood normal.         Behavior: Behavior normal.       Health Maintenence:     Health Maintenance Topics with due status: Overdue       Topic Date Due    COVID-19 Vaccine 09/01/2023            ASSESSMENT AND PLAN:   1. Acute non-recurrent maxillary sinusitis  - azithromycin 250 MG Oral Tab; Take 2 tablets (500 mg total) by mouth daily for 1 day, THEN 1 tablet (250 mg total) daily for 4 days.  Dispense: 6 tablet; Refill: 0    2. Left ear pain  - azithromycin 250 MG Oral Tab; Take 2 tablets (500 mg total) by mouth daily for 1 day, THEN 1 tablet (250 mg total) daily for 4 days.  Dispense: 6 tablet; Refill: 0    3. Acute cough  - benzonatate 200 MG Oral Cap; Take 1 capsule (200 mg total) by mouth 3 (three) times daily as needed for cough.  Dispense: 30 capsule; Refill: 0  - dextromethorphan-guaiFENesin  MG/5ML Oral Liquid; Take 5 mL by mouth every 12 (twelve) hours.  Dispense: 236 mL; Refill: 0          The patient indicates understanding of these issues and agrees to the plan.  No follow-ups on file.        This note was prepared using Dragon Medical voice recognition dictation software. As a result errors may occur. When identified these errors have been corrected. While every attempt is made to correct errors during dictation discrepancies may still exist.    Brittney Patrick MD

## 2024-05-15 ENCOUNTER — OFFICE VISIT (OUTPATIENT)
Dept: INTERNAL MEDICINE CLINIC | Facility: CLINIC | Age: 64
End: 2024-05-15

## 2024-05-15 VITALS
HEIGHT: 67 IN | OXYGEN SATURATION: 98 % | DIASTOLIC BLOOD PRESSURE: 70 MMHG | SYSTOLIC BLOOD PRESSURE: 122 MMHG | HEART RATE: 64 BPM | BODY MASS INDEX: 27 KG/M2 | WEIGHT: 172 LBS

## 2024-05-15 DIAGNOSIS — R04.0 FREQUENT NOSEBLEEDS: Primary | ICD-10-CM

## 2024-05-15 PROCEDURE — 99213 OFFICE O/P EST LOW 20 MIN: CPT

## 2024-05-15 NOTE — PROGRESS NOTES
Subjective:   Salomón Wilde is a 63 year old male who presents for Nose Problem (Frequent bloody nose for about 15 days, only has been happening in right nostril and feels pain in his head when his nose starts to bleed)     C/c frequent nosebleeds over the past two weeks, they occur randomly without trigger. He puts a kleenex up his nose and tips his head back and the bleeding stops within about 5 minutes but he feels blood dripping down the back of his throat. Blood comes out of the right nostril only and he states it is a significant amount of bleeding. Also feels a mild pain on the back and right side of his head when the bleeding starts. No other neurological symptoms.  The past 3 days he had no bleeding   Sometimes when he sneezes he starts bleeding     A month ago he had a sinus infection and was using flonase for about 6 days, denies any current nasal spray use  When he was younger he sometimes had nosebleeds from both sides but has not had this in a long time  Denies recurrent sinus infections or seasonal allergies     When he checks BP at home it is around 130 systolic   Takes aspirin about every 2-3 days     History/Other:    Chief Complaint Reviewed and Verified  Nursing Notes Reviewed and   Verified  Tobacco Reviewed  Allergies Reviewed  Medications Reviewed    Problem List Reviewed  Medical History Reviewed  Surgical History   Reviewed  Family History Reviewed           Current Outpatient Medications   Medication Sig Dispense Refill    aspirin 81 MG Oral Chew Tab Chew 1 tablet (81 mg total) by mouth daily. 90 tablet 3    metFORMIN  MG Oral Tablet 24 Hr Take 1 tablet (500 mg total) by mouth daily with breakfast. 90 tablet 3    Multiple Vitamins-Minerals (MULTIVITAMIN ADULT OR) Take by mouth.       Review of Systems:  Review of Systems   Constitutional: Negative.    HENT:          Frequent bloody nose   Respiratory: Negative.     Cardiovascular: Negative.    Gastrointestinal: Negative.     Skin: Negative.    Neurological:  Positive for headaches.     Objective:   /70   Pulse 64   Ht 5' 7\" (1.702 m)   Wt 172 lb (78 kg)   SpO2 98%   BMI 26.94 kg/m²  Estimated body mass index is 26.94 kg/m² as calculated from the following:    Height as of this encounter: 5' 7\" (1.702 m).    Weight as of this encounter: 172 lb (78 kg).  Physical Exam  Vitals reviewed.   Constitutional:       General: He is not in acute distress.     Appearance: Normal appearance. He is well-developed.   HENT:      Nose:      Right Turbinates: Enlarged and pale.      Left Turbinates: Enlarged and pale.   Cardiovascular:      Rate and Rhythm: Normal rate and regular rhythm.      Heart sounds: Normal heart sounds.   Pulmonary:      Effort: Pulmonary effort is normal.      Breath sounds: Normal breath sounds.   Skin:     General: Skin is warm and dry.   Neurological:      Mental Status: He is alert and oriented to person, place, and time.       Assessment & Plan:   1. Frequent nosebleeds (Primary)  -     ENT Referral - [place] ([facility])  Start a daily allergy medication and saline nasal spray until he sees ENT    OSMANY Reina, 5/15/2024, 3:30 PM

## 2024-09-19 ENCOUNTER — OFFICE VISIT (OUTPATIENT)
Dept: INTERNAL MEDICINE CLINIC | Facility: CLINIC | Age: 64
End: 2024-09-19

## 2024-09-19 VITALS
TEMPERATURE: 98 F | HEART RATE: 65 BPM | BODY MASS INDEX: 27 KG/M2 | DIASTOLIC BLOOD PRESSURE: 80 MMHG | WEIGHT: 172 LBS | HEIGHT: 67 IN | SYSTOLIC BLOOD PRESSURE: 139 MMHG | OXYGEN SATURATION: 97 %

## 2024-09-19 DIAGNOSIS — R05.1 ACUTE COUGH: Primary | ICD-10-CM

## 2024-09-19 PROCEDURE — 99214 OFFICE O/P EST MOD 30 MIN: CPT

## 2024-09-19 RX ORDER — FLUTICASONE PROPIONATE 50 MCG
2 SPRAY, SUSPENSION (ML) NASAL DAILY
Qty: 15.8 ML | Refills: 0 | Status: SHIPPED | OUTPATIENT
Start: 2024-09-19 | End: 2024-09-19

## 2024-09-19 RX ORDER — BENZONATATE 100 MG/1
100 CAPSULE ORAL 2 TIMES DAILY PRN
Qty: 20 CAPSULE | Refills: 0 | Status: SHIPPED | OUTPATIENT
Start: 2024-09-19

## 2024-09-19 RX ORDER — AZITHROMYCIN 250 MG/1
TABLET, FILM COATED ORAL
Qty: 6 TABLET | Refills: 0 | Status: SHIPPED | OUTPATIENT
Start: 2024-09-19 | End: 2024-09-23

## 2024-09-19 NOTE — PROGRESS NOTES
Subjective:   Salomón Wilde is a 63 year old male who presents for Cough, Runny Nose, and Sore Throat     C/c has been sick for two weeks   Symptoms have remained the same, not worsening or improving   Coughing with runny nose, dry throat but no sore throat   Congestion and clear rhinorrhea   Cough is mostly dry but sometimes feels phlegm   No fever or chills   Sometimes he feels short of breath like he is drowning and needs to take deep breaths    Had some leftover amoxicillin at home and took one every 8 hours for 8 tablets   And some leftover benzonatate which also did not help much     History/Other:    Chief Complaint Reviewed and Verified  No Further Nursing Notes to   Review  Tobacco Reviewed  Allergies Reviewed  Medications Reviewed    Problem List Reviewed  Medical History Reviewed  Surgical History   Reviewed  Family History Reviewed           Current Outpatient Medications   Medication Sig Dispense Refill    benzonatate 100 MG Oral Cap Take 1 capsule (100 mg total) by mouth 2 (two) times daily as needed. 20 capsule 0    azithromycin (ZITHROMAX Z-SARAH) 250 MG Oral Tab Take 2 tablets (500 mg total) by mouth daily for 1 day, THEN 1 tablet (250 mg total) daily for 4 days. 6 tablet 0    aspirin 81 MG Oral Chew Tab Chew 1 tablet (81 mg total) by mouth daily. 90 tablet 3    metFORMIN  MG Oral Tablet 24 Hr Take 1 tablet (500 mg total) by mouth daily with breakfast. 90 tablet 3    Multiple Vitamins-Minerals (MULTIVITAMIN ADULT OR) Take by mouth.       Review of Systems:  Review of Systems  10 point review of systems otherwise negative with the exception of HPI and assessment and plan    Objective:   /80   Pulse 65   Temp 97.5 °F (36.4 °C)   Ht 5' 7\" (1.702 m)   Wt 172 lb (78 kg)   SpO2 97%   BMI 26.94 kg/m²  Estimated body mass index is 26.94 kg/m² as calculated from the following:    Height as of this encounter: 5' 7\" (1.702 m).    Weight as of this encounter: 172 lb (78 kg).  Physical  Exam  Vitals reviewed.   Constitutional:       General: He is not in acute distress.     Appearance: Normal appearance. He is well-developed.   HENT:      Right Ear: Tympanic membrane normal.      Left Ear: Tympanic membrane normal.      Nose: Rhinorrhea present.      Mouth/Throat:      Mouth: Mucous membranes are moist.      Pharynx: Posterior oropharyngeal erythema present.   Cardiovascular:      Rate and Rhythm: Normal rate and regular rhythm.      Heart sounds: Normal heart sounds.   Pulmonary:      Effort: Pulmonary effort is normal.      Breath sounds: Normal breath sounds.   Lymphadenopathy:      Cervical: No cervical adenopathy.   Skin:     General: Skin is warm and dry.   Neurological:      Mental Status: He is alert and oriented to person, place, and time.       Assessment & Plan:   1. Acute cough (Primary)  -     Benzonatate; Take 1 capsule (100 mg total) by mouth 2 (two) times daily as needed.  Dispense: 20 capsule; Refill: 0  -     Azithromycin; Take 2 tablets (500 mg total) by mouth daily for 1 day, THEN 1 tablet (250 mg total) daily for 4 days.  Dispense: 6 tablet; Refill: 0  Saline nasal spray     OSMANY Reina, 9/19/2024, 8:59 AM

## 2025-01-06 ENCOUNTER — OFFICE VISIT (OUTPATIENT)
Dept: INTERNAL MEDICINE CLINIC | Facility: CLINIC | Age: 65
End: 2025-01-06

## 2025-01-06 VITALS
HEIGHT: 67 IN | SYSTOLIC BLOOD PRESSURE: 125 MMHG | DIASTOLIC BLOOD PRESSURE: 76 MMHG | HEART RATE: 64 BPM | OXYGEN SATURATION: 99 % | TEMPERATURE: 98 F | WEIGHT: 168 LBS | BODY MASS INDEX: 26.37 KG/M2

## 2025-01-06 DIAGNOSIS — R05.3 CHRONIC COUGH: Primary | ICD-10-CM

## 2025-01-06 PROCEDURE — 99213 OFFICE O/P EST LOW 20 MIN: CPT | Performed by: INTERNAL MEDICINE

## 2025-01-06 RX ORDER — MONTELUKAST SODIUM 10 MG/1
10 TABLET ORAL DAILY
Qty: 30 TABLET | Refills: 0 | Status: SHIPPED | OUTPATIENT
Start: 2025-01-06 | End: 2026-01-01

## 2025-01-06 NOTE — PROGRESS NOTES
Subjective:     Patient ID: Salomón Wilde is a 64 year old male.    Cough        History/Other: Came in today complaining of cough.  According to the patient this is ongoing for almost a month.  He denies any fever or chills.  He denies any sinus pressure or congestion.  He denies any pain when he coughs he denies any shortness of breath  He is not a smoker.  Denies any weight loss.  Does not have acid refux  Review of Systems   Constitutional: Negative.    HENT: Negative.     Respiratory:  Positive for cough.    Cardiovascular: Negative.    Genitourinary: Negative.    Neurological: Negative.      Current Outpatient Medications   Medication Sig Dispense Refill    montelukast (SINGULAIR) 10 MG Oral Tab Take 1 tablet (10 mg total) by mouth daily. 30 tablet 0    aspirin 81 MG Oral Chew Tab Chew 1 tablet (81 mg total) by mouth daily. 90 tablet 3    metFORMIN  MG Oral Tablet 24 Hr Take 1 tablet (500 mg total) by mouth daily with breakfast. 90 tablet 3     Allergies:Allergies[1]    Past Medical History:    Asthma (HCC)    Prostate cancer (HCC)      Past Surgical History:   Procedure Laterality Date    Cholecystectomy      Hernia surgery      Other      prostate removal    Total knee replacement Right       Family History   Problem Relation Age of Onset    Diabetes Mother       Social History:   Social History     Socioeconomic History    Marital status:    Tobacco Use    Smoking status: Former     Current packs/day: 0.00     Average packs/day: 0.3 packs/day for 15.0 years (3.8 ttl pk-yrs)     Types: Cigarettes     Start date:      Quit date:      Years since quittin.0   Substance and Sexual Activity    Alcohol use: Not Currently     Comment: socially    Drug use: No     Social Drivers of Health     Food Insecurity: No Food Insecurity (2025)    NCSS - Food Insecurity     Worried About Running Out of Food in the Last Year: No     Ran Out of Food in the Last Year: No   Transportation Needs: No  Transportation Needs (1/6/2025)    NCSS - Transportation     Lack of Transportation: No    Received from [facility], [facility]    Social Connections   Housing Stability: Not At Risk (1/6/2025)    NCSS - Housing/Utilities     Has Housing: Yes     Worried About Losing Housing: No     Unable to Get Utilities: No        Objective:   Physical Exam  Vitals and nursing note reviewed.   Constitutional:       Appearance: Normal appearance.   Cardiovascular:      Rate and Rhythm: Normal rate and regular rhythm.      Pulses: Normal pulses.      Heart sounds: Normal heart sounds.   Pulmonary:      Effort: Pulmonary effort is normal.      Breath sounds: Normal breath sounds.   Abdominal:      Palpations: Abdomen is soft.   Musculoskeletal:         General: Normal range of motion.      Cervical back: Normal range of motion and neck supple.   Skin:     General: Skin is warm.   Neurological:      Mental Status: He is alert. Mental status is at baseline.   Psychiatric:         Mood and Affect: Mood normal.         Assessment & Plan:   1. Chronic cough    I will order chest x-ray, will start him on Singulair, If cough persist follow up     No orders of the defined types were placed in this encounter.      Meds This Visit:  Requested Prescriptions     Signed Prescriptions Disp Refills    montelukast (SINGULAIR) 10 MG Oral Tab 30 tablet 0     Sig: Take 1 tablet (10 mg total) by mouth daily.       Imaging & Referrals:  XR CHEST PA + LAT CHEST (CPT=71046)            [1] No Known Allergies

## 2025-01-07 ENCOUNTER — HOSPITAL ENCOUNTER (OUTPATIENT)
Dept: GENERAL RADIOLOGY | Age: 65
Discharge: HOME | End: 2025-01-07
Attending: INTERNAL MEDICINE
Payer: MEDICARE

## 2025-01-07 DIAGNOSIS — R05.3 CHRONIC COUGH: ICD-10-CM

## 2025-01-07 PROCEDURE — 71046 X-RAY EXAM CHEST 2 VIEWS: CPT | Performed by: INTERNAL MEDICINE

## 2025-03-06 ENCOUNTER — TELEPHONE (OUTPATIENT)
Dept: INTERNAL MEDICINE CLINIC | Facility: CLINIC | Age: 65
End: 2025-03-06

## 2025-06-25 PROBLEM — C61 PROSTATE CANCER (HCC): Status: RESOLVED | Noted: 2023-06-29 | Resolved: 2025-06-25

## 2025-06-25 PROBLEM — Z85.46 HISTORY OF PROSTATE CANCER: Status: ACTIVE | Noted: 2025-06-25

## 2025-07-02 ENCOUNTER — OFFICE VISIT (OUTPATIENT)
Dept: INTERNAL MEDICINE CLINIC | Facility: CLINIC | Age: 65
End: 2025-07-02

## 2025-07-02 VITALS
SYSTOLIC BLOOD PRESSURE: 127 MMHG | WEIGHT: 166 LBS | OXYGEN SATURATION: 98 % | HEART RATE: 54 BPM | HEIGHT: 67 IN | RESPIRATION RATE: 16 BRPM | BODY MASS INDEX: 26.06 KG/M2 | DIASTOLIC BLOOD PRESSURE: 80 MMHG | TEMPERATURE: 98 F

## 2025-07-02 DIAGNOSIS — E11.9 TYPE 2 DIABETES MELLITUS WITHOUT COMPLICATION, WITHOUT LONG-TERM CURRENT USE OF INSULIN (HCC): ICD-10-CM

## 2025-07-02 DIAGNOSIS — Z00.00 MEDICARE ANNUAL WELLNESS VISIT, SUBSEQUENT: Primary | ICD-10-CM

## 2025-07-02 DIAGNOSIS — Z12.5 SCREENING FOR PROSTATE CANCER: ICD-10-CM

## 2025-07-02 DIAGNOSIS — Z00.00 ENCOUNTER FOR ANNUAL HEALTH EXAMINATION: ICD-10-CM

## 2025-07-02 LAB
ALBUMIN SERPL-MCNC: 5 G/DL (ref 3.2–4.8)
ALBUMIN/GLOB SERPL: 2.4 {RATIO} (ref 1–2)
ALP LIVER SERPL-CCNC: 58 U/L (ref 45–117)
ALT SERPL-CCNC: 21 U/L (ref 10–49)
ANION GAP SERPL CALC-SCNC: 5 MMOL/L (ref 0–18)
AST SERPL-CCNC: 26 U/L (ref ?–34)
BASOPHILS # BLD AUTO: 0.06 X10(3) UL (ref 0–0.2)
BASOPHILS NFR BLD AUTO: 0.9 %
BILIRUB SERPL-MCNC: 0.9 MG/DL (ref 0.2–1.1)
BUN BLD-MCNC: 17 MG/DL (ref 9–23)
BUN/CREAT SERPL: 16.2 (ref 10–20)
CALCIUM BLD-MCNC: 9.8 MG/DL (ref 8.7–10.4)
CHLORIDE SERPL-SCNC: 107 MMOL/L (ref 98–112)
CHOLEST SERPL-MCNC: 170 MG/DL (ref ?–200)
CO2 SERPL-SCNC: 28 MMOL/L (ref 21–32)
COMPLEXED PSA SERPL-MCNC: <0.04 NG/ML (ref ?–4)
CREAT BLD-MCNC: 1.05 MG/DL (ref 0.7–1.3)
CREAT UR-SCNC: 102.9 MG/DL
DEPRECATED RDW RBC AUTO: 48.3 FL (ref 35.1–46.3)
EGFRCR SERPLBLD CKD-EPI 2021: 79 ML/MIN/1.73M2 (ref 60–?)
EOSINOPHIL # BLD AUTO: 0.41 X10(3) UL (ref 0–0.7)
EOSINOPHIL NFR BLD AUTO: 6.3 %
ERYTHROCYTE [DISTWIDTH] IN BLOOD BY AUTOMATED COUNT: 14.1 % (ref 11–15)
FASTING PATIENT LIPID ANSWER: YES
FASTING STATUS PATIENT QL REPORTED: YES
GLOBULIN PLAS-MCNC: 2.1 G/DL (ref 2–3.5)
GLUCOSE BLD-MCNC: 88 MG/DL (ref 70–99)
HCT VFR BLD AUTO: 48 % (ref 39–53)
HDLC SERPL-MCNC: 64 MG/DL (ref 40–59)
HGB BLD-MCNC: 15.2 G/DL (ref 13–17.5)
IMM GRANULOCYTES # BLD AUTO: 0.02 X10(3) UL (ref 0–1)
IMM GRANULOCYTES NFR BLD: 0.3 %
LDLC SERPL CALC-MCNC: 92 MG/DL (ref ?–100)
LYMPHOCYTES # BLD AUTO: 2.33 X10(3) UL (ref 1–4)
LYMPHOCYTES NFR BLD AUTO: 35.7 %
MCH RBC QN AUTO: 29.2 PG (ref 26–34)
MCHC RBC AUTO-ENTMCNC: 31.7 G/DL (ref 31–37)
MCV RBC AUTO: 92.3 FL (ref 80–100)
MICROALBUMIN UR-MCNC: <0.3 MG/DL
MONOCYTES # BLD AUTO: 0.48 X10(3) UL (ref 0.1–1)
MONOCYTES NFR BLD AUTO: 7.4 %
NEUTROPHILS # BLD AUTO: 3.22 X10 (3) UL (ref 1.5–7.7)
NEUTROPHILS # BLD AUTO: 3.22 X10(3) UL (ref 1.5–7.7)
NEUTROPHILS NFR BLD AUTO: 49.4 %
NONHDLC SERPL-MCNC: 106 MG/DL (ref ?–130)
OSMOLALITY SERPL CALC.SUM OF ELEC: 291 MOSM/KG (ref 275–295)
PLATELET # BLD AUTO: 194 10(3)UL (ref 150–450)
POTASSIUM SERPL-SCNC: 5.5 MMOL/L (ref 3.5–5.1)
PROT SERPL-MCNC: 7.1 G/DL (ref 5.7–8.2)
RBC # BLD AUTO: 5.2 X10(6)UL (ref 3.8–5.8)
SODIUM SERPL-SCNC: 140 MMOL/L (ref 136–145)
TRIGL SERPL-MCNC: 71 MG/DL (ref 30–149)
TSI SER-ACNC: 1.82 UIU/ML (ref 0.55–4.78)
VLDLC SERPL CALC-MCNC: 11 MG/DL (ref 0–30)
WBC # BLD AUTO: 6.5 X10(3) UL (ref 4–11)

## 2025-07-02 PROCEDURE — 96160 PT-FOCUSED HLTH RISK ASSMT: CPT | Performed by: INTERNAL MEDICINE

## 2025-07-02 PROCEDURE — 3061F NEG MICROALBUMINURIA REV: CPT | Performed by: INTERNAL MEDICINE

## 2025-07-02 PROCEDURE — 3074F SYST BP LT 130 MM HG: CPT | Performed by: INTERNAL MEDICINE

## 2025-07-02 PROCEDURE — G0402 INITIAL PREVENTIVE EXAM: HCPCS | Performed by: INTERNAL MEDICINE

## 2025-07-02 PROCEDURE — 3008F BODY MASS INDEX DOCD: CPT | Performed by: INTERNAL MEDICINE

## 2025-07-02 PROCEDURE — 36415 COLL VENOUS BLD VENIPUNCTURE: CPT | Performed by: INTERNAL MEDICINE

## 2025-07-02 PROCEDURE — 3079F DIAST BP 80-89 MM HG: CPT | Performed by: INTERNAL MEDICINE

## 2025-07-02 RX ORDER — ATORVASTATIN CALCIUM 20 MG/1
20 TABLET, FILM COATED ORAL NIGHTLY
Qty: 90 TABLET | Refills: 1 | Status: SHIPPED | OUTPATIENT
Start: 2025-07-02

## 2025-07-02 RX ORDER — CHOLECALCIFEROL (VITAMIN D3) 25 MCG
1000 TABLET ORAL DAILY
COMMUNITY

## 2025-07-02 RX ORDER — RIBOFLAVIN (VITAMIN B2) 100 MG
100 TABLET ORAL DAILY
COMMUNITY

## 2025-07-02 NOTE — PROGRESS NOTES
Subjective:   Salomón Wilde is a 64 year old male who presents for a MA AHA (Medicare Advantage Annual Health Assessment) and Initial Annual Wellness Visit (outside the first 12 months of Medicare eligibility, no prior AWV) and scheduled follow up of multiple significant but stable problems.               History/Other:   Fall Risk Assessment:   [unfilled]     Cognitive Assessment:   He had a completely normal cognitive assessment - see flowsheet entries     Functional Ability/Status:   Salomón Wilde has some abnormal functions as listed below:  He has Hearing problems based on screening of functional status.He has Vision problems based on screening of functional status.       Depression Screening (PHQ):  PHQ-2 SCORE: 0  , done 7/2/2025        5 minutes spent screening and counseling for depression    Advanced Directives:   He does NOT have a Living Will. [ ]  He does NOT have a Power of  for Health Care. [ ]  Discussed Advance Care Planning with patient (and family/surrogate if present). Standard forms made available to patient in After Visit Summary.      Problem List[1]  Allergies:  He has no known allergies.    Current Medications:  Active Meds, Sig Only[2]    Medical History:  He  has a past medical history of Asthma (HCC) and Prostate cancer (HCC).  Surgical History:  He  has a past surgical history that includes other; hernia surgery; cholecystectomy; and total knee replacement (Right).   Family History:  His family history includes Diabetes in his mother.  Social History:  He  reports that he quit smoking about 32 years ago. His smoking use included cigarettes. He started smoking about 47 years ago. He has a 3.8 pack-year smoking history. He does not have any smokeless tobacco history on file. He reports that he does not currently use alcohol. He reports that he does not use drugs.    Tobacco:  Doesn't smoke     CAGE Alcohol Screen:   CAGE screening score of 0 on 7/2/2025, showing low risk of  alcohol abuse.      Patient Care Team:  Tyrese Estrella MD as PCP - General    Review of Systems  GENERAL: feels well otherwise  SKIN: denies any unusual skin lesions  EYES: denies blurred vision or double vision  HEENT: denies nasal congestion, sinus pain or ST  LUNGS: denies shortness of breath with exertion  CARDIOVASCULAR: denies chest pain on exertion  GI: denies abdominal pain, denies heartburn  : 1 per night nocturia, no complaint of urinary incontinence  MUSCULOSKELETAL: denies back pain  NEURO: denies headaches  PSYCHE: denies depression or anxiety  HEMATOLOGIC: denies hx of anemia  ENDOCRINE: denies thyroid history  ALL/ASTHMA: denies hx of allergy or asthma    Objective:   Physical Exam  General Appearance:  Alert, cooperative, no distress, appears stated age   Head:  Normocephalic, without obvious abnormality, atraumatic   Eyes:  PERRL, conjunctiva/corneas clear, EOM's intact, both eyes   Ears:  Normal TM's and external ear canals, both ears   Nose: Nares normal, septum midline, mucosa normal, no drainage or sinus tenderness   Throat: Lips, mucosa, and tongue normal; teeth and gums normal   Neck: Supple, symmetrical, trachea midline, no adenopathy, thyroid: not enlarged, symmetric, no tenderness/mass/nodules, no carotid bruit or JVD   Back:   Symmetric, no curvature, ROM normal, no CVA tenderness   Lungs:   Clear to auscultation bilaterally, respirations unlabored   Chest Wall:  No tenderness or deformity   Heart:  Regular rate and rhythm, S1, S2 normal, no murmur, rub or gallop   Abdomen:   Soft, non-tender, bowel sounds active all four quadrants,  no masses, no organomegaly   Genitalia: Normal male   Rectal: Normal tone, normal prostate, no masses or tenderness   Extremities: Extremities normal, atraumatic, no cyanosis or edema   Pulses: 2+ and symmetric   Skin: Skin color, texture, turgor normal, no rashes or lesions   Lymph nodes: Cervical, supraclavicular, and axillary nodes normal   Neurologic:  Normal   Bilateral barefoot skin diabetic exam is normal, visualized feet and the appearance is normal.  Bilateral monofilament/sensation of both feet is normal.  Pulsation pedal pulse exam of both lower legs/feet is normal as well.     /80   Pulse 54   Temp 97.8 °F (36.6 °C) (Oral)   Resp 16   Ht 5' 7\" (1.702 m)   Wt 166 lb (75.3 kg)   SpO2 98%   BMI 26.00 kg/m²  Estimated body mass index is 26 kg/m² as calculated from the following:    Height as of this encounter: 5' 7\" (1.702 m).    Weight as of this encounter: 166 lb (75.3 kg).    Medicare Hearing Assessment:   Hearing Screening    Time taken: 7/2/2025  9:02 AM  Entry User: Jimena Greenwood CMA  Screening Method: Finger Rub  Finger Rub Result: Pass         Visual Acuity:   Right Eye Visual Acuity: Corrected Right Eye Chart Acuity: 20/20   Left Eye Visual Acuity: Corrected Left Eye Chart Acuity: 20/20   Both Eyes Visual Acuity: Corrected Both Eyes Chart Acuity: 20/20   Able To Tolerate Visual Acuity: Yes        Assessment & Plan:   Salomón Wilde is a 64 year old male who presents for a Medicare Assessment.     1. Encounter for annual health examination (Primary)  -     CBC With Differential With Platelet; Future; Expected date: 07/02/2025  -     Comp Metabolic Panel (14); Future; Expected date: 07/02/2025  -     TSH W Reflex To Free T4; Future; Expected date: 07/02/2025  -     Hemoglobin A1C; Future; Expected date: 07/02/2025  -     Lipid Panel; Future; Expected date: 07/02/2025  2. Type 2 diabetes mellitus without complication, without long-term current use of insulin (HCC) stable, will check hba1c,referral for ophthalmology  -     Hemoglobin A1C; Future; Expected date: 07/02/2025  -     Ophthalmology Referral - In Network  -     Microalb/Creat Ratio, Random Urine; Future; Expected date: 07/02/2025  3. Screening for prostate cancer psa  -     PSA Total, Screen; Future; Expected date: 07/02/2025    Other orders  -     Atorvastatin Calcium; Take  1 tablet (20 mg total) by mouth nightly.  Dispense: 90 tablet; Refill: 1            The patient indicates understanding of these issues and agrees to the plan.  Reinforced healthy diet, lifestyle, and exercise.      No follow-ups on file.     COOKIE ESTRELLA MD, 7/2/2025     Supplementary Documentation:   General Health:       Health Maintenance   Topic Date Due    COVID-19 Vaccine (2 - 2024-25 season) 09/01/2024    Annual Well Visit  Never done    Annual Depression Screening  01/01/2025    Fall Risk Screening (Annual)  Never done    PSA  06/29/2025    Influenza Vaccine (1) 10/01/2025    Colorectal Cancer Screening  01/01/2030    Pneumococcal Vaccine: 50+ Years  Completed    Zoster Vaccines  Completed    Meningococcal B Vaccine  Aged Out            [1]   Patient Active Problem List  Diagnosis    Vitamin D deficiency    Mixed hyperlipidemia    Prediabetes    Primary osteoarthritis of both knees    Varicose veins    History of prostate cancer   [2]   Outpatient Medications Marked as Taking for the 7/2/25 encounter (Office Visit) with Cookie Estrella MD   Medication Sig    cholecalciferol 25 MCG (1000 UT) Oral Tab Take 1 tablet (1,000 Units total) by mouth daily.    Ascorbic Acid (VITAMIN C) 100 MG Oral Tab Take 1 tablet (100 mg total) by mouth daily.    atorvastatin 20 MG Oral Tab Take 1 tablet (20 mg total) by mouth nightly.    aspirin 81 MG Oral Chew Tab Chew 1 tablet (81 mg total) by mouth daily.    metFORMIN  MG Oral Tablet 24 Hr Take 1 tablet (500 mg total) by mouth daily with breakfast.

## 2025-07-03 ENCOUNTER — TELEPHONE (OUTPATIENT)
Dept: INTERNAL MEDICINE CLINIC | Facility: CLINIC | Age: 65
End: 2025-07-03

## 2025-07-03 LAB
EST. AVERAGE GLUCOSE BLD GHB EST-MCNC: 120 MG/DL (ref 68–126)
HBA1C MFR BLD: 5.8 % (ref ?–5.7)

## 2025-07-03 NOTE — TELEPHONE ENCOUNTER
Pt called back, stated he will be out of town after today will be back next Friday for the Lab test       Isidra Neri CMA  7/3/2025 12:20 PM CDT       Called the patient and had to leave a message.   I advised him of the results and also to  the one does medication from the pharmacy.  I further advised him that he needs to return to the lab on Saturday to re-check his BMP.      Cookie Estrella MD  7/3/2025 11:20 AM CDT        hemoglobin A1c is 5.8*continue with metformin, very well-controlled cholesterol is okay his potassium is high I will give him 1 dose of Kayexalate and he needs to recheck his BMP in 2 days, kidney function is okay, PSA is okay, thyroid is okay, urine microalbumin creatinine ratio is okay

## 2025-07-29 ENCOUNTER — LAB ENCOUNTER (OUTPATIENT)
Dept: LAB | Age: 65
End: 2025-07-29
Attending: INTERNAL MEDICINE

## 2025-07-29 DIAGNOSIS — E87.5 HYPERKALEMIA: ICD-10-CM

## 2025-07-29 LAB — POTASSIUM SERPL-SCNC: 4.3 MMOL/L (ref 3.5–5.1)

## 2025-07-29 PROCEDURE — 84132 ASSAY OF SERUM POTASSIUM: CPT

## 2025-07-29 PROCEDURE — 36415 COLL VENOUS BLD VENIPUNCTURE: CPT

## (undated) NOTE — LETTER
7/2/2025          To Whom It May Concern:    Salomón Wilde is currently under my medical care and he had his annual exam today.    If you require additional information please contact our office.        Sincerely,    BERNARD LAZCANO MD          Document generated by:  BERNARD LAZCANO MD